# Patient Record
Sex: MALE | Race: OTHER | Employment: FULL TIME | URBAN - METROPOLITAN AREA
[De-identification: names, ages, dates, MRNs, and addresses within clinical notes are randomized per-mention and may not be internally consistent; named-entity substitution may affect disease eponyms.]

---

## 2017-04-24 ENCOUNTER — OFFICE VISIT (OUTPATIENT)
Dept: FAMILY MEDICINE CLINIC | Facility: CLINIC | Age: 32
End: 2017-04-24

## 2017-04-24 VITALS
RESPIRATION RATE: 20 BRPM | DIASTOLIC BLOOD PRESSURE: 98 MMHG | WEIGHT: 315 LBS | HEIGHT: 73.25 IN | BODY MASS INDEX: 41.3 KG/M2 | SYSTOLIC BLOOD PRESSURE: 142 MMHG | HEART RATE: 96 BPM

## 2017-04-24 DIAGNOSIS — R06.81 WITNESSED APNEIC SPELLS: ICD-10-CM

## 2017-04-24 DIAGNOSIS — Z00.00 ROUTINE GENERAL MEDICAL EXAMINATION AT A HEALTH CARE FACILITY: Primary | ICD-10-CM

## 2017-04-24 PROCEDURE — 99385 PREV VISIT NEW AGE 18-39: CPT | Performed by: FAMILY MEDICINE

## 2017-04-24 NOTE — PATIENT INSTRUCTIONS
-eat smaller portion sizes, limit seconds, drink more water during meals, eat slower in order to feel full sooner  -eat fruits or vegetables for snacks if hungry in between  -limit juice and soda  -exercise goal of 20-30min for 2-3x/wk to start  -schedul

## 2017-04-24 NOTE — PROGRESS NOTES
Jennifer Serna is a 28year old male who is here for Patient presents with:  Physical      HPI:     Here for physical, has been > 1 yr    Screening:  Diet: could be better  Exercise: could be better  Sleep: thinks he has apnea, interseted in sleep stud Alcohol Use: Yes           0.6 - 1.2 oz/week       1-2 Cans of beer per week          EXAM:   /98 mmHg  Pulse 96  Resp 20  Ht 73.25\"  Wt 388 lb  BMI 50.82 kg/m2    GENERAL: A&O well developed, well nourished,in no apparent distress  HEENT: atraumati

## 2017-04-27 ENCOUNTER — LAB ENCOUNTER (OUTPATIENT)
Dept: LAB | Age: 32
End: 2017-04-27
Attending: FAMILY MEDICINE
Payer: COMMERCIAL

## 2017-04-27 DIAGNOSIS — Z00.00 ROUTINE GENERAL MEDICAL EXAMINATION AT A HEALTH CARE FACILITY: ICD-10-CM

## 2017-04-27 PROCEDURE — 80061 LIPID PANEL: CPT | Performed by: FAMILY MEDICINE

## 2017-04-27 PROCEDURE — 36415 COLL VENOUS BLD VENIPUNCTURE: CPT | Performed by: FAMILY MEDICINE

## 2017-04-27 PROCEDURE — 82306 VITAMIN D 25 HYDROXY: CPT | Performed by: FAMILY MEDICINE

## 2017-04-27 PROCEDURE — 80050 GENERAL HEALTH PANEL: CPT | Performed by: FAMILY MEDICINE

## 2017-05-05 NOTE — PROGRESS NOTES
Quick Note:    Blood counts, electrolytes, sugar and thyroid all normal  -cholesterol mildly elevated, increase exercise, reduce fats and sugars, more fruits and veggies  -vit d low - -Vit D 50,000 units weekly x 8 wks, then recheck Vit D upon completion

## 2017-05-08 ENCOUNTER — TELEPHONE (OUTPATIENT)
Dept: FAMILY MEDICINE CLINIC | Facility: CLINIC | Age: 32
End: 2017-05-08

## 2017-05-08 RX ORDER — ERGOCALCIFEROL 1.25 MG/1
50000 CAPSULE ORAL WEEKLY
Qty: 8 CAPSULE | Refills: 0 | Status: SHIPPED | OUTPATIENT
Start: 2017-05-08 | End: 2017-07-07

## 2017-05-08 NOTE — TELEPHONE ENCOUNTER
----- Message from David Parkinson MD sent at 5/5/2017 12:38 PM CDT -----  Blood counts, electrolytes, sugar and thyroid all normal  -cholesterol mildly elevated, increase exercise, reduce fats and sugars, more fruits and veggies  -vit d low - -Vit D 50,00

## 2017-05-08 NOTE — TELEPHONE ENCOUNTER
Spoke to patient and answered his questions. Pt verbalized understanding. He states he is scheduled for a sleep study mid month as well.

## 2017-05-08 NOTE — TELEPHONE ENCOUNTER
Newton Garcia has a few questions for Edison Hinds that was not mentioned on the message, that he was concerned about, please call him at [phone]

## 2017-05-17 ENCOUNTER — OFFICE VISIT (OUTPATIENT)
Dept: SLEEP CENTER | Facility: HOSPITAL | Age: 32
End: 2017-05-17
Attending: FAMILY MEDICINE
Payer: COMMERCIAL

## 2017-05-17 PROCEDURE — 95810 POLYSOM 6/> YRS 4/> PARAM: CPT

## 2017-05-22 NOTE — PROCEDURES
[address] [address],[facility] [address]       Accredited by the [facility] ([facility])    PATIENT'S NAME:        Terrence Hernandez  ATTENDING PHYSICIAN:   Scooter Taylor M.D. REFERRING PHYSICIAN:   MD JESSA Fraire apnea and hypopnea index of 22 events per hour. The patient was also manifested obstructive event with an obstructive apnea-hypopnea index of 5 events per hour. Throughout the study, the patient maintained an oxyhemoglobin saturation above 90%.   Cheyne-S

## 2017-05-24 RX ORDER — LISINOPRIL 10 MG/1
TABLET ORAL
Qty: 30 TABLET | Refills: 2 | Status: SHIPPED | OUTPATIENT
Start: 2017-05-24 | End: 2017-09-18

## 2017-05-24 NOTE — TELEPHONE ENCOUNTER
Faxed refill request received from [facility] for lisinopril 10 mg  This was prescribed by previous provider  OK to refill?

## 2017-05-31 ENCOUNTER — TELEPHONE (OUTPATIENT)
Dept: FAMILY MEDICINE CLINIC | Facility: CLINIC | Age: 32
End: 2017-05-31

## 2017-05-31 DIAGNOSIS — G47.33 OSA (OBSTRUCTIVE SLEEP APNEA): Primary | ICD-10-CM

## 2017-05-31 NOTE — TELEPHONE ENCOUNTER
----- Message from Mina Arshad MD sent at 5/31/2017  2:22 PM CDT -----  Abnormal sleep study  -please refer to pulmonary for followup HONORIO Matute) - dx: FLASH  -thanks

## 2017-05-31 NOTE — PROGRESS NOTES
Quick Note:    Abnormal sleep study  -please refer to pulmonary for followup HONORIO Cortes) - dx: FLASH  -thanks  ______

## 2017-05-31 NOTE — TELEPHONE ENCOUNTER
Lm on private voicemail, per signed consent, with sleep study results and recommendation to follow up with a pulmonologist.  Pt provided with Dr. Lucero Busch office contact information and advised to call the office if he has any questions or concerns.

## 2017-06-28 ENCOUNTER — OFFICE VISIT (OUTPATIENT)
Dept: SLEEP CENTER | Facility: HOSPITAL | Age: 32
End: 2017-06-28
Attending: INTERNAL MEDICINE
Payer: COMMERCIAL

## 2017-06-28 PROCEDURE — 95811 POLYSOM 6/>YRS CPAP 4/> PARM: CPT

## 2017-07-05 NOTE — PROCEDURES
[address]       Accredited by the [facility] ([facility])    PATIENT'S NAME:        Julia Sebastian  ATTENDING PHYSICIAN:   Radha Rodríguez M.D.   REFERRING PHYSICIAN:   Radha Rodríguez M.D. Obstructive apneas and hypopneas as well as central apneas were resolved with CPAP at 11 cm of water.   At this pressure, during supine REM sleep the patient did not manifest any significant respiratory events and maintained an oxyhemoglobin saturation abov

## 2017-07-31 ENCOUNTER — TELEPHONE (OUTPATIENT)
Dept: FAMILY MEDICINE CLINIC | Facility: CLINIC | Age: 32
End: 2017-07-31

## 2017-07-31 DIAGNOSIS — E55.9 VITAMIN D DEFICIENCY: Primary | ICD-10-CM

## 2017-07-31 NOTE — TELEPHONE ENCOUNTER
Silvia Cherry had tonsilitis about 2 weeks ago and his tonsils are still hurting him, he does not see any redness or swelling, he is just wondering if this is normal or if something else can be going on. He would like a call from the office.  [phone]

## 2017-07-31 NOTE — TELEPHONE ENCOUNTER
Future Appointments  Date Time Provider [place]   8/4/2017 7:00 AM Rafael Shipley MD [facility] [place]   9/14/2017 3:40 PM RAJESH [place] [address] [facility] [address]     Patient also needs order for Vit D lab    Patient advised to take ibupr

## 2017-09-18 RX ORDER — LISINOPRIL 10 MG/1
TABLET ORAL
Qty: 30 TABLET | Refills: 2 | Status: SHIPPED | OUTPATIENT
Start: 2017-09-18 | End: 2018-01-15

## 2018-01-15 RX ORDER — LISINOPRIL 10 MG/1
TABLET ORAL
Qty: 30 TABLET | Refills: 0 | Status: SHIPPED | OUTPATIENT
Start: 2018-01-15 | End: 2018-03-05

## 2018-01-15 NOTE — TELEPHONE ENCOUNTER
Lisinopril approved qty 30 NR  Patient past due for ov  Please call to schedule appt-will need for further refills  [phone] (home)

## 2018-03-05 ENCOUNTER — OFFICE VISIT (OUTPATIENT)
Dept: FAMILY MEDICINE CLINIC | Facility: CLINIC | Age: 33
End: 2018-03-05

## 2018-03-05 VITALS
WEIGHT: 315 LBS | HEART RATE: 108 BPM | HEIGHT: 73.5 IN | DIASTOLIC BLOOD PRESSURE: 76 MMHG | BODY MASS INDEX: 40.86 KG/M2 | SYSTOLIC BLOOD PRESSURE: 138 MMHG

## 2018-03-05 DIAGNOSIS — G47.33 OSA (OBSTRUCTIVE SLEEP APNEA): ICD-10-CM

## 2018-03-05 DIAGNOSIS — E66.01 MORBID OBESITY DUE TO EXCESS CALORIES (HCC): ICD-10-CM

## 2018-03-05 DIAGNOSIS — K76.0 STEATOSIS OF LIVER: ICD-10-CM

## 2018-03-05 DIAGNOSIS — E78.5 DYSLIPIDEMIA: ICD-10-CM

## 2018-03-05 DIAGNOSIS — I10 ESSENTIAL HYPERTENSION WITH GOAL BLOOD PRESSURE LESS THAN 140/90: Primary | ICD-10-CM

## 2018-03-05 PROCEDURE — 99215 OFFICE O/P EST HI 40 MIN: CPT | Performed by: FAMILY MEDICINE

## 2018-03-05 RX ORDER — LISINOPRIL 10 MG/1
TABLET ORAL
Qty: 90 TABLET | Refills: 0 | Status: SHIPPED | OUTPATIENT
Start: 2018-03-05 | End: 2018-07-13

## 2018-03-05 NOTE — PATIENT INSTRUCTIONS
-- cut out soda and sweets  -- limit carbs  -- increase walking  -- come in for labs any day after 4/27  -- followup with me after that in early May for weight check

## 2018-03-07 NOTE — PROGRESS NOTES
Jossue Bearden is a 35year old male here for Patient presents with:  Hypertension  Knee Pain: bilateral knee pain       HPI:     1. Essential hypertension with goal blood pressure less than 140/90  2. Steatosis of liver  3.  Morbid obesity due to excess BY MOUTH ONCE DAILY Disp: 90 tablet Rfl: 0   Calcipotriene-Betameth Diprop (TACLONEX) 0.005-0.064 % External Ointment Apply topically and gently message into affected area once daily.  Disp: 100 g Rfl: 2       Allergies:  No Known Allergies      ROS:     --

## 2018-07-13 RX ORDER — LISINOPRIL 10 MG/1
TABLET ORAL
Qty: 30 TABLET | Refills: 0 | Status: SHIPPED | OUTPATIENT
Start: 2018-07-13 | End: 2018-07-20

## 2018-07-13 NOTE — TELEPHONE ENCOUNTER
Dr Kidd,patient's last labs were in 04/2017. Please advise lab orders for patient.  He has failed the refill protocol

## 2018-07-20 ENCOUNTER — LAB ENCOUNTER (OUTPATIENT)
Dept: LAB | Age: 33
End: 2018-07-20
Attending: FAMILY MEDICINE
Payer: COMMERCIAL

## 2018-07-20 ENCOUNTER — OFFICE VISIT (OUTPATIENT)
Dept: FAMILY MEDICINE CLINIC | Facility: CLINIC | Age: 33
End: 2018-07-20
Payer: COMMERCIAL

## 2018-07-20 VITALS
SYSTOLIC BLOOD PRESSURE: 140 MMHG | HEART RATE: 88 BPM | BODY MASS INDEX: 41.3 KG/M2 | HEIGHT: 73.23 IN | WEIGHT: 315 LBS | DIASTOLIC BLOOD PRESSURE: 88 MMHG

## 2018-07-20 DIAGNOSIS — Z00.00 ROUTINE GENERAL MEDICAL EXAMINATION AT A HEALTH CARE FACILITY: ICD-10-CM

## 2018-07-20 DIAGNOSIS — E78.5 DYSLIPIDEMIA: ICD-10-CM

## 2018-07-20 DIAGNOSIS — M54.50 CHRONIC BILATERAL LOW BACK PAIN WITHOUT SCIATICA: ICD-10-CM

## 2018-07-20 DIAGNOSIS — Z00.00 ROUTINE GENERAL MEDICAL EXAMINATION AT A HEALTH CARE FACILITY: Primary | ICD-10-CM

## 2018-07-20 DIAGNOSIS — I10 ESSENTIAL HYPERTENSION: ICD-10-CM

## 2018-07-20 DIAGNOSIS — E55.9 VITAMIN D DEFICIENCY: ICD-10-CM

## 2018-07-20 DIAGNOSIS — G89.29 CHRONIC BILATERAL LOW BACK PAIN WITHOUT SCIATICA: ICD-10-CM

## 2018-07-20 LAB
ALBUMIN SERPL-MCNC: 3.6 G/DL (ref 3.5–4.8)
ALBUMIN/GLOB SERPL: 0.7 {RATIO} (ref 1–2)
ALP LIVER SERPL-CCNC: 98 U/L (ref 45–117)
ALT SERPL-CCNC: 128 U/L (ref 17–63)
ANION GAP SERPL CALC-SCNC: 6 MMOL/L (ref 0–18)
AST SERPL-CCNC: 89 U/L (ref 15–41)
BASOPHILS # BLD AUTO: 0.07 X10(3) UL (ref 0–0.1)
BASOPHILS NFR BLD AUTO: 0.8 %
BILIRUB SERPL-MCNC: 0.6 MG/DL (ref 0.1–2)
BUN BLD-MCNC: 9 MG/DL (ref 8–20)
BUN/CREAT SERPL: 11.7 (ref 10–20)
CALCIUM BLD-MCNC: 9.1 MG/DL (ref 8.3–10.3)
CHLORIDE SERPL-SCNC: 105 MMOL/L (ref 101–111)
CHOLEST SMN-MCNC: 193 MG/DL (ref ?–200)
CO2 SERPL-SCNC: 28 MMOL/L (ref 22–32)
CREAT BLD-MCNC: 0.77 MG/DL (ref 0.7–1.3)
EOSINOPHIL # BLD AUTO: 0.24 X10(3) UL (ref 0–0.3)
EOSINOPHIL NFR BLD AUTO: 2.8 %
ERYTHROCYTE [DISTWIDTH] IN BLOOD BY AUTOMATED COUNT: 13.2 % (ref 11.5–16)
GLOBULIN PLAS-MCNC: 5.2 G/DL (ref 2.5–3.7)
GLUCOSE BLD-MCNC: 77 MG/DL (ref 70–99)
HCT VFR BLD AUTO: 47.8 % (ref 37–53)
HDLC SERPL-MCNC: 38 MG/DL (ref 45–?)
HDLC SERPL: 5.08 {RATIO} (ref ?–4.97)
HGB BLD-MCNC: 15.6 G/DL (ref 13–17)
IMMATURE GRANULOCYTE COUNT: 0.03 X10(3) UL (ref 0–1)
IMMATURE GRANULOCYTE RATIO %: 0.4 %
LDLC SERPL CALC-MCNC: 138 MG/DL (ref ?–130)
LYMPHOCYTES # BLD AUTO: 2.04 X10(3) UL (ref 0.9–4)
LYMPHOCYTES NFR BLD AUTO: 24.1 %
M PROTEIN MFR SERPL ELPH: 8.8 G/DL (ref 6.1–8.3)
MCH RBC QN AUTO: 29.7 PG (ref 27–33.2)
MCHC RBC AUTO-ENTMCNC: 32.6 G/DL (ref 31–37)
MCV RBC AUTO: 91 FL (ref 80–99)
MONOCYTES # BLD AUTO: 0.56 X10(3) UL (ref 0.1–1)
MONOCYTES NFR BLD AUTO: 6.6 %
NEUTROPHIL ABS PRELIM: 5.53 X10 (3) UL (ref 1.3–6.7)
NEUTROPHILS # BLD AUTO: 5.53 X10(3) UL (ref 1.3–6.7)
NEUTROPHILS NFR BLD AUTO: 65.3 %
NONHDLC SERPL-MCNC: 155 MG/DL (ref ?–130)
OSMOLALITY SERPL CALC.SUM OF ELEC: 285 MOSM/KG (ref 275–295)
PLATELET # BLD AUTO: 248 10(3)UL (ref 150–450)
POTASSIUM SERPL-SCNC: 4.3 MMOL/L (ref 3.6–5.1)
RBC # BLD AUTO: 5.25 X10(6)UL (ref 4.3–5.7)
RED CELL DISTRIBUTION WIDTH-SD: 44.9 FL (ref 35.1–46.3)
SODIUM SERPL-SCNC: 139 MMOL/L (ref 136–144)
TRIGL SERPL-MCNC: 86 MG/DL (ref ?–150)
TSI SER-ACNC: 2.64 MIU/ML (ref 0.35–5.5)
VIT D+METAB SERPL-MCNC: 10.1 NG/ML (ref 30–100)
VLDLC SERPL CALC-MCNC: 17 MG/DL (ref 5–40)
WBC # BLD AUTO: 8.5 X10(3) UL (ref 4–13)

## 2018-07-20 PROCEDURE — 99395 PREV VISIT EST AGE 18-39: CPT | Performed by: FAMILY MEDICINE

## 2018-07-20 PROCEDURE — 82306 VITAMIN D 25 HYDROXY: CPT | Performed by: FAMILY MEDICINE

## 2018-07-20 PROCEDURE — 80050 GENERAL HEALTH PANEL: CPT | Performed by: FAMILY MEDICINE

## 2018-07-20 PROCEDURE — 36415 COLL VENOUS BLD VENIPUNCTURE: CPT | Performed by: FAMILY MEDICINE

## 2018-07-20 PROCEDURE — 80061 LIPID PANEL: CPT | Performed by: FAMILY MEDICINE

## 2018-07-20 PROCEDURE — 99214 OFFICE O/P EST MOD 30 MIN: CPT | Performed by: FAMILY MEDICINE

## 2018-07-20 RX ORDER — LISINOPRIL 10 MG/1
10 TABLET ORAL
Qty: 90 TABLET | Refills: 1 | Status: SHIPPED | OUTPATIENT
Start: 2018-07-20 | End: 2019-02-21

## 2018-07-20 RX ORDER — METFORMIN HYDROCHLORIDE 500 MG/1
1000 TABLET, EXTENDED RELEASE ORAL
Qty: 60 TABLET | Refills: 2 | Status: SHIPPED | OUTPATIENT
Start: 2018-07-20 | End: 2018-12-10

## 2018-07-20 NOTE — PATIENT INSTRUCTIONS
-- continue with diet and exercise changes  -- start metformin 1 tab daily with food for 1-2 wks, then increase to 2tabs each morning  -- continue lisinopril    -- call to schedule PT around wed next week    -- followup in 1 month, sooner if needed

## 2018-07-20 NOTE — PROGRESS NOTES
Chantal Redmond is a 35year old male who is here for Patient presents with:  Hypertension: follow-up  Cholesterol: Dislipidemia      HPI:     1. Routine general medical examination at a health care facility  -interested in wellness    2.  Dyslipidemia rashes  PSYCH: mood is stable  EXT: denies edema     Wt Readings from Last 6 Encounters:  07/20/18 : (!) 412 lb  03/05/18 : (!) 406 lb  09/21/17 : (!) 390 lb  06/12/17 : (!) 384 lb  04/24/17 : (!) 388 lb  04/28/16 : (!) 382 lb      No Known Allergies    Pa II-XII grossly intact  PSYCH: pleasant  EXTREMITIES: no cyanosis, clubbing or edema      Problem focused exam (for problems outside of physical, if any):    MUSCULOSKELETAL: normal gait, no appreciable defects; tenderness in b/l lumbar paraspinals  SKIN: p these issues and agrees to the plan. The patient is asked to return in 1 month, sooner prn.   Tamela Mazariegos MD

## 2018-07-30 ENCOUNTER — TELEPHONE (OUTPATIENT)
Dept: FAMILY MEDICINE CLINIC | Facility: CLINIC | Age: 33
End: 2018-07-30

## 2018-07-30 DIAGNOSIS — E55.9 VITAMIN D DEFICIENCY: ICD-10-CM

## 2018-07-30 DIAGNOSIS — R79.89 ELEVATED LFTS: Primary | ICD-10-CM

## 2018-07-30 DIAGNOSIS — E78.00 ELEVATED LDL CHOLESTEROL LEVEL: ICD-10-CM

## 2018-07-30 RX ORDER — ERGOCALCIFEROL 1.25 MG/1
50000 CAPSULE ORAL WEEKLY
Qty: 12 CAPSULE | Refills: 0 | Status: SHIPPED | OUTPATIENT
Start: 2018-07-30 | End: 2018-10-28

## 2018-07-30 NOTE — TELEPHONE ENCOUNTER
The patient was informed of his test results and Dr. Yonatan Vernon recommendations. Vitamin d prescription was sent to the patient's preferred pharmacy and future lab orders were placed.  The patient verbalized understanding and has no further questions at this t

## 2018-07-30 NOTE — TELEPHONE ENCOUNTER
----- Message from Geetha Barnett MD sent at 7/29/2018 11:05 PM CDT -----  Vit D low - Vit D 50,000 units weekly x 12 wks, then recheck Vit D upon completion    cholesterol and liver function tests elevated - increase exercise, reduce fats and sugars, mor

## 2018-07-30 NOTE — PROGRESS NOTES
Vit D low - Vit D 50,000 units weekly x 12 wks, then recheck Vit D upon completion    cholesterol and liver function tests elevated - increase exercise, reduce fats and sugars, more fruits and veggies, try to lose weight    Recheck vit D, lipid and cmp in

## 2018-12-10 RX ORDER — METFORMIN HYDROCHLORIDE 500 MG/1
TABLET, EXTENDED RELEASE ORAL
Qty: 60 TABLET | Refills: 1 | Status: SHIPPED | OUTPATIENT
Start: 2018-12-10 | End: 2019-02-21

## 2018-12-18 ENCOUNTER — TELEPHONE (OUTPATIENT)
Dept: FAMILY MEDICINE CLINIC | Facility: CLINIC | Age: 33
End: 2018-12-18

## 2018-12-18 ENCOUNTER — OFFICE VISIT (OUTPATIENT)
Dept: FAMILY MEDICINE CLINIC | Facility: CLINIC | Age: 33
End: 2018-12-18
Payer: COMMERCIAL

## 2018-12-18 ENCOUNTER — APPOINTMENT (OUTPATIENT)
Dept: LAB | Age: 33
End: 2018-12-18
Attending: FAMILY MEDICINE
Payer: COMMERCIAL

## 2018-12-18 ENCOUNTER — OFFICE VISIT (OUTPATIENT)
Dept: PHYSICAL THERAPY | Age: 33
End: 2018-12-18
Attending: FAMILY MEDICINE
Payer: COMMERCIAL

## 2018-12-18 VITALS
HEART RATE: 92 BPM | OXYGEN SATURATION: 98 % | DIASTOLIC BLOOD PRESSURE: 82 MMHG | SYSTOLIC BLOOD PRESSURE: 138 MMHG | HEIGHT: 73.82 IN | WEIGHT: 315 LBS | BODY MASS INDEX: 40.43 KG/M2 | TEMPERATURE: 99 F

## 2018-12-18 DIAGNOSIS — R79.89 ELEVATED LFTS: ICD-10-CM

## 2018-12-18 DIAGNOSIS — M54.50 CHRONIC BILATERAL LOW BACK PAIN WITHOUT SCIATICA: ICD-10-CM

## 2018-12-18 DIAGNOSIS — L40.9 PSORIASIS: ICD-10-CM

## 2018-12-18 DIAGNOSIS — R10.11 RUQ PAIN: ICD-10-CM

## 2018-12-18 DIAGNOSIS — G89.29 CHRONIC BILATERAL LOW BACK PAIN WITHOUT SCIATICA: ICD-10-CM

## 2018-12-18 DIAGNOSIS — E55.9 VITAMIN D DEFICIENCY: ICD-10-CM

## 2018-12-18 DIAGNOSIS — I10 ESSENTIAL HYPERTENSION: Primary | ICD-10-CM

## 2018-12-18 DIAGNOSIS — E78.00 ELEVATED LDL CHOLESTEROL LEVEL: ICD-10-CM

## 2018-12-18 DIAGNOSIS — E66.01 CLASS 3 SEVERE OBESITY DUE TO EXCESS CALORIES WITHOUT SERIOUS COMORBIDITY WITH BODY MASS INDEX (BMI) OF 50.0 TO 59.9 IN ADULT (HCC): ICD-10-CM

## 2018-12-18 PROCEDURE — 97110 THERAPEUTIC EXERCISES: CPT

## 2018-12-18 PROCEDURE — 97161 PT EVAL LOW COMPLEX 20 MIN: CPT

## 2018-12-18 PROCEDURE — 36415 COLL VENOUS BLD VENIPUNCTURE: CPT | Performed by: FAMILY MEDICINE

## 2018-12-18 PROCEDURE — 99214 OFFICE O/P EST MOD 30 MIN: CPT | Performed by: FAMILY MEDICINE

## 2018-12-18 PROCEDURE — 80061 LIPID PANEL: CPT | Performed by: FAMILY MEDICINE

## 2018-12-18 PROCEDURE — 82306 VITAMIN D 25 HYDROXY: CPT | Performed by: FAMILY MEDICINE

## 2018-12-18 PROCEDURE — 80053 COMPREHEN METABOLIC PANEL: CPT | Performed by: FAMILY MEDICINE

## 2018-12-18 RX ORDER — CALCIPOTRIENE, BETAMETHASONE DIPROPIONATE 50; .643 UG/G; MG/G
OINTMENT TOPICAL
Qty: 100 G | Refills: 2 | Status: SHIPPED | OUTPATIENT
Start: 2018-12-18 | End: 2018-12-18

## 2018-12-18 NOTE — PATIENT INSTRUCTIONS
-- call dermatology for appt  -- call weight loss clinic for appt  -- call to schedule US of abdomen for gallstones  -- increase metformin to 2x/day  -- followup in 3 months, sooner if needed

## 2018-12-18 NOTE — TELEPHONE ENCOUNTER
Called pt and lmom for pt to call his previous derm for his med info and then asked him to call our office with this information

## 2018-12-18 NOTE — TELEPHONE ENCOUNTER
Doc,    The script for the calcipot/betame oint is too expensive. It will cost patient over $1000 because not covered by insurance. Please advise.   thanks

## 2018-12-18 NOTE — PROGRESS NOTES
SPINE EVALUATION:   Referring Physician: Dr. Barba Case  Diagnosis: Chronic bilateral low back pain without sciatica      Date of Service: 12/18/2018     PATIENT SUMMARY   Sarah Nguyen is a 35year old male who presents to therapy today with complaints o household and community level activity with gradually increasing pain over the past 10 years. Pt goals include to find a way to get a good stretch on his own, find out what is going on, and to go home feeling better.    Past medical history was reviewed wit light touch intact throughout, patient reports intermittent feeling of tingling in the back and LEs without numbness    Lumbar AROM:   Flexion: fingertips to mid-lower leg, 86 deg, reporting feeling of pulling KIM posterior LEs  Extension: 16 deg with midl response to treatment, patient is encouraged to be in PT and hopeful that it will be helpful since has been experiencing this pain for so many years.  Following assessment, use of repeated motions and slump sliders, pain decreased from 5/10 baseline to 3/10 return this letter via fax as soon as possible to [phone].  If you have any questions, please contact me at Dept: [phone]    Sincerely,  Electronically signed by therapist: Sejal Lebron PT, DPT    Physician's certification required: Yes  I certif

## 2018-12-18 NOTE — PROGRESS NOTES
Prabhjot Frey is a 35year old male here for Patient presents with:  Weight Loss: follow Weight loss and check Hgb1ac  Liver Enzymes: Follow up   Psoriasis: Referral to Dermatologist      HPI:       1.  Essential hypertension  -trying to eat better  -driver (TACLONEX) 0.005-0.064 % External Ointment Apply topically and gently message into affected area once daily.  Disp: 100 g Rfl: 2   METFORMIN HCL  MG Oral Tablet 24 Hr TAKE 2 TABLETS BY MOUTH IN THE MORNING WITH BREAKFAST (Patient taking differently: T Sig: Apply topically and gently message into affected area once daily.        Imaging & Referrals:  [facility] - INTERNAL  OP REFERRAL TO WEIGHT LOSS CLINIC  US ABDOMEN LIMITED (AUU=[id number])     Rolf Post MD

## 2018-12-19 RX ORDER — CLOBETASOL PROPIONATE 0.5 MG/G
1 CREAM TOPICAL 2 TIMES DAILY
Qty: 100 G | Refills: 1 | Status: SHIPPED | OUTPATIENT
Start: 2018-12-19 | End: 2019-04-22

## 2018-12-19 NOTE — TELEPHONE ENCOUNTER
Spoke to patient and he spoke to his pharmacist and was told that he could use the generic 0.05% oint clobetasol instead.     Is this something you want to change him to?    thanks

## 2018-12-20 ENCOUNTER — OFFICE VISIT (OUTPATIENT)
Dept: PHYSICAL THERAPY | Age: 33
End: 2018-12-20
Attending: FAMILY MEDICINE
Payer: COMMERCIAL

## 2018-12-20 PROCEDURE — 97110 THERAPEUTIC EXERCISES: CPT

## 2018-12-20 PROCEDURE — 97140 MANUAL THERAPY 1/> REGIONS: CPT

## 2018-12-20 NOTE — PROGRESS NOTES
Dx: Chronic bilateral low back pain without sciatica           Authorized # of Visits:  10 requested (PPO)       Next MD visit: none scheduled  Fall Risk: standard         Precautions: n/a             Subjective: Patient reports that he has been feeling an SB seated EOM QL x 5 R/L         Standing slider/tensioner with foot on step, IR/ER x 15R/L         - standing ext at table x 6  - with L foot on step 2 x 5         Supine piriformis stretch with strap 2 x 30s R/L         - PPU with L flexion x 8  -

## 2018-12-27 ENCOUNTER — APPOINTMENT (OUTPATIENT)
Dept: PHYSICAL THERAPY | Age: 33
End: 2018-12-27
Attending: FAMILY MEDICINE
Payer: COMMERCIAL

## 2018-12-27 ENCOUNTER — TELEPHONE (OUTPATIENT)
Dept: FAMILY MEDICINE CLINIC | Facility: CLINIC | Age: 33
End: 2018-12-27

## 2018-12-27 RX ORDER — ERGOCALCIFEROL 1.25 MG/1
50000 CAPSULE ORAL WEEKLY
Qty: 12 CAPSULE | Refills: 0 | Status: SHIPPED | OUTPATIENT
Start: 2018-12-27 | End: 2019-03-27

## 2018-12-27 NOTE — TELEPHONE ENCOUNTER
----- Message from Nhi Bazzi MD sent at 12/27/2018  3:10 PM CST -----  Cholesterol and liver function still elevated, but improving. Continue with diet and exercise changes as discussed.       Vit D low - Vit D 50,000 units weekly x 12 wks, then star

## 2019-01-04 ENCOUNTER — TELEPHONE (OUTPATIENT)
Dept: FAMILY MEDICINE CLINIC | Facility: CLINIC | Age: 34
End: 2019-01-04

## 2019-01-04 NOTE — TELEPHONE ENCOUNTER
Patient advised that the clobetasol requires a PA and that it won't be completed today. It will be completed next week and we will notify patient and pharmacy once it has been approved/denied.    Patient verbalized understanding

## 2019-01-04 NOTE — TELEPHONE ENCOUNTER
Pt said he has been waiting 2 days for an approval on Clobetasol Propionate ointment. He said his pharmacy faxed it over on Wednesday and he would like it approved.

## 2019-01-08 ENCOUNTER — APPOINTMENT (OUTPATIENT)
Dept: PHYSICAL THERAPY | Age: 34
End: 2019-01-08
Attending: FAMILY MEDICINE
Payer: COMMERCIAL

## 2019-01-10 ENCOUNTER — OFFICE VISIT (OUTPATIENT)
Dept: PHYSICAL THERAPY | Age: 34
End: 2019-01-10
Attending: FAMILY MEDICINE
Payer: COMMERCIAL

## 2019-01-10 PROCEDURE — 97140 MANUAL THERAPY 1/> REGIONS: CPT

## 2019-01-10 PROCEDURE — 97110 THERAPEUTIC EXERCISES: CPT

## 2019-01-10 NOTE — PROGRESS NOTES
Dx: Chronic bilateral low back pain without sciatica           Authorized # of Visits:  10 requested (PPO)       Next MD visit: none scheduled  Fall Risk: standard         Precautions: n/a             Subjective: Patient has not been seen in PT for several issued and progressed     Plan: Continue PT with progression as indicated.    Date: 12/20/2018 TX#: 2/10 Date: 1/10/19   TX#: 3/10   Date:               TX#: 4/ Date:               TX#: 5/ Date:               TX#: 6/ Date:               TX#: 7/ Date:

## 2019-01-15 ENCOUNTER — OFFICE VISIT (OUTPATIENT)
Dept: PHYSICAL THERAPY | Age: 34
End: 2019-01-15
Attending: FAMILY MEDICINE
Payer: COMMERCIAL

## 2019-01-15 PROCEDURE — 97110 THERAPEUTIC EXERCISES: CPT

## 2019-01-15 PROCEDURE — 97140 MANUAL THERAPY 1/> REGIONS: CPT

## 2019-01-15 NOTE — PROGRESS NOTES
Dx: Chronic bilateral low back pain without sciatica           Authorized # of Visits:  10 requested (PPO)       Next MD visit: none scheduled  Fall Risk: standard         Precautions: n/a           10 min late to session.    Subjective: Patient reports kalie progress made in PT. - issued and progressed     Plan: Continue PT with progression as indicated, progression endurance training.    Date: 12/20/2018 TX#: 2/10 Date: 1/10/19   TX#: 3/10   Date: 1/15/19    TX#: 4/10 Date:               TX#: 5/ Date: progression within tolerance, including progressed abdominal recruitment, initiated lumbar paraspinal endurance training.    HEP: LTR (hold at this time), piriformis stretch with strap, PPU with LLE bent up, and seated slump slider    Charges: Manual 1, The

## 2019-01-17 ENCOUNTER — OFFICE VISIT (OUTPATIENT)
Dept: PHYSICAL THERAPY | Age: 34
End: 2019-01-17
Attending: FAMILY MEDICINE
Payer: COMMERCIAL

## 2019-01-17 PROCEDURE — 97110 THERAPEUTIC EXERCISES: CPT

## 2019-01-17 PROCEDURE — 97140 MANUAL THERAPY 1/> REGIONS: CPT

## 2019-01-17 NOTE — PROGRESS NOTES
Dx: Chronic bilateral low back pain without sciatica           Authorized # of Visits:  10 requested (PPO)       Next MD visit: none scheduled  Fall Risk: standard         Precautions: n/a             Subjective: Patient reports feeling better today than o and compliant in HEP to maintain progress made in PT. - issued and progressed     Plan: Continue PT with progression as indicated, progression and focus on endurance training, lifting moderate-heavy loads.    Date: 12/20/2018 TX#: 2/10 Date: 1/10/19   TX#: with pelvic tilt 2 x 10  - SB bridge, arms at side x 10 (fatigued) - supine 90/90 march with pelvic tilt 2 x 10  - SB bridge, arms at side x 10, x 5 (fatigued)      Manual x 15 min  - lumbar STM  - lumbar C-PA mobs gr II/III L2-S1 Manual x 15 min  - lumbar

## 2019-01-22 ENCOUNTER — OFFICE VISIT (OUTPATIENT)
Dept: PHYSICAL THERAPY | Age: 34
End: 2019-01-22
Attending: FAMILY MEDICINE
Payer: COMMERCIAL

## 2019-01-22 PROCEDURE — 97140 MANUAL THERAPY 1/> REGIONS: CPT

## 2019-01-22 PROCEDURE — 97110 THERAPEUTIC EXERCISES: CPT

## 2019-01-22 NOTE — PROGRESS NOTES
Dx: Chronic bilateral low back pain without sciatica           Authorized # of Visits:  10 requested (PPO)       Next MD visit: none scheduled  Fall Risk: standard         Precautions: n/a             Subjective: Patient reports that he has been feeling pr be independent and compliant in HEP to maintain progress made in PT. - issued and progressed     Plan: Continue PT with progression as indicated, progression and focus on endurance training, lifting moderate-heavy loads.    Date: 12/20/2018 TX#: 2/10 Date: alt hip ext with focused glute set x 10 R/L Prone alt hip ext with focused glute set x 10 R/L Prone alt hip ext with focused glute set x 10 x 5 R/L Prone alt hip ext 2 x 10 (getting easier)     - supine pelvic tilt x 10  - supine pelvic tilt with march x 1

## 2019-01-24 ENCOUNTER — OFFICE VISIT (OUTPATIENT)
Dept: PHYSICAL THERAPY | Age: 34
End: 2019-01-24
Attending: FAMILY MEDICINE
Payer: COMMERCIAL

## 2019-01-24 PROCEDURE — 97140 MANUAL THERAPY 1/> REGIONS: CPT

## 2019-01-24 PROCEDURE — 97110 THERAPEUTIC EXERCISES: CPT

## 2019-01-24 NOTE — PROGRESS NOTES
Discharge Summary  Dx: Chronic bilateral low back pain without sciatica           Authorized # of Visits:  10 requested (PPO)       Next MD visit: none scheduled  Fall Risk: standard         Precautions: n/a        Pt has attended 7, cancelled 2, and no s WNL; L WNL  Rotation: R WNL; L WNL     Palpation: lumbar paraspinals WNL, NTTP to patient report     Strength:   LE   Hip flexion: R 5/5; L 5/5  Hip abduction: R 5/5; L 5/5  Hip Extension: R 5/5; L 5/5  Knee Flexion: R 5/5; L 5/5            Knee extension: 5B x 15R/L Shuttle  - DLS, 7B x 25  - SLS, 5B x 15 R/L Shuttle  - DLS, 8B x 30  - SLS, 6B x 15 R/L Shuttle  - DLS, 8B x 30  - SLS, 6B x 15 R/L    Standing slider/tensioner with foot on step, IR/ER x 15R/L Standing slider/tensioner with foot on step, IR/ER tilt 2 x 10  - SB bridge, arms at side x 10 (fatigued) - supine 90/90 march with pelvic tilt 2 x 10  - SB bridge, arms at side x 10, x 5 (fatigued) - supine 90/90 march with pelvic tilt 2 x 10  - SB bridge, arms at side 2 x 10  - SB 90/90 isometric with al

## 2019-01-29 ENCOUNTER — APPOINTMENT (OUTPATIENT)
Dept: PHYSICAL THERAPY | Age: 34
End: 2019-01-29
Payer: COMMERCIAL

## 2019-02-21 RX ORDER — METFORMIN HYDROCHLORIDE 500 MG/1
TABLET, EXTENDED RELEASE ORAL
Qty: 60 TABLET | Refills: 0 | Status: SHIPPED | OUTPATIENT
Start: 2019-02-21 | End: 2019-04-22

## 2019-02-21 RX ORDER — LISINOPRIL 10 MG/1
TABLET ORAL
Qty: 90 TABLET | Refills: 0 | Status: SHIPPED | OUTPATIENT
Start: 2019-02-21 | End: 2019-06-06

## 2019-04-22 ENCOUNTER — OFFICE VISIT (OUTPATIENT)
Dept: FAMILY MEDICINE CLINIC | Facility: CLINIC | Age: 34
End: 2019-04-22
Payer: COMMERCIAL

## 2019-04-22 VITALS
TEMPERATURE: 99 F | SYSTOLIC BLOOD PRESSURE: 134 MMHG | WEIGHT: 315 LBS | HEIGHT: 73.82 IN | HEART RATE: 102 BPM | BODY MASS INDEX: 40.43 KG/M2 | DIASTOLIC BLOOD PRESSURE: 78 MMHG

## 2019-04-22 DIAGNOSIS — L40.9 PSORIASIS: ICD-10-CM

## 2019-04-22 DIAGNOSIS — R79.89 ELEVATED LFTS: ICD-10-CM

## 2019-04-22 DIAGNOSIS — E66.01 CLASS 3 SEVERE OBESITY DUE TO EXCESS CALORIES WITHOUT SERIOUS COMORBIDITY WITH BODY MASS INDEX (BMI) OF 50.0 TO 59.9 IN ADULT (HCC): ICD-10-CM

## 2019-04-22 DIAGNOSIS — I10 ESSENTIAL HYPERTENSION: Primary | ICD-10-CM

## 2019-04-22 PROCEDURE — 99214 OFFICE O/P EST MOD 30 MIN: CPT | Performed by: FAMILY MEDICINE

## 2019-04-22 RX ORDER — METFORMIN HYDROCHLORIDE 500 MG/1
TABLET, EXTENDED RELEASE ORAL
Qty: 90 TABLET | Refills: 1 | Status: SHIPPED | OUTPATIENT
Start: 2019-04-22 | End: 2019-09-23

## 2019-04-22 RX ORDER — CLOBETASOL PROPIONATE 0.5 MG/G
1 OINTMENT TOPICAL 2 TIMES DAILY
Qty: 200 G | Refills: 1 | Status: SHIPPED | OUTPATIENT
Start: 2019-04-22 | End: 2020-03-10

## 2019-04-22 NOTE — PATIENT INSTRUCTIONS
-- continue to work on lifestyle changes  --try to increase exericse  -- continue with metformin once daily  -- continue with lisinopril    -- followup in 3 months to recheck weight and for your next physical (anytime after 7/20/19)  -- look into immigra

## 2019-04-24 NOTE — PROGRESS NOTES
Aileen Ramierz is a 29year old male here for Patient presents with:  Weight Loss: Follow up      HPI:       1. Essential hypertension  2. Class 3 severe obesity due to excess calories without serious comorbidity with body mass index (BMI) of 50.0 to 59. 1   metFORMIN HCl  MG Oral Tablet 24 Hr TAKE 1 TABLETS BY MOUTH ONCE DAILY IN THE MORNING WITH BREAKFAST Disp: 90 tablet Rfl: 1   LISINOPRIL 10 MG Oral Tab TAKE 1 TABLET BY MOUTH ONCE DAILY Disp: 90 tablet Rfl: 0       Allergies:  No Known Allergies

## 2019-06-06 RX ORDER — LISINOPRIL 10 MG/1
TABLET ORAL
Qty: 90 TABLET | Refills: 0 | Status: SHIPPED | OUTPATIENT
Start: 2019-06-06 | End: 2019-09-23

## 2019-09-23 NOTE — PROGRESS NOTES
Betina Garcia is a 29year old male here for Patient presents with: Anxiety      HPI:       1.  Anxiety  -has had for some time now - over a year  -notes difficulty sleeping (getting worse) - has symptoms of palpitations, frequent worry  -also notes inc Tab Take 1 tablet (10 mg total) by mouth once daily. Disp: 90 tablet Rfl: 1   buPROPion HCl ER, XL, 150 MG Oral Tablet 24 Hr Take 1 tablet (150 mg total) by mouth daily.  Disp: 90 tablet Rfl: 0   hydrOXYzine HCl 25 MG Oral Tab 1 - 2 tabs po prn severe anxie types were placed in this encounter. Meds This Visit:  Requested Prescriptions     Signed Prescriptions Disp Refills   • lisinopril 10 MG Oral Tab 90 tablet 1     Sig: Take 1 tablet (10 mg total) by mouth once daily.    • buPROPion HCl ER, XL, 150 MG O

## 2019-09-23 NOTE — PATIENT INSTRUCTIONS
Start wellbutrin each morning as directed    Hydroxyzine as needed for anxiety at night    If not working, can use clonazepam as needed for severe anxiety (may cause drowsiness)     will call with counseling options    Followup in 2 wks, kimberly

## 2019-12-16 ENCOUNTER — TELEPHONE (OUTPATIENT)
Dept: FAMILY MEDICINE CLINIC | Facility: CLINIC | Age: 34
End: 2019-12-16

## 2019-12-16 RX ORDER — BUPROPION HYDROCHLORIDE 150 MG/1
150 TABLET ORAL DAILY
Qty: 90 TABLET | Refills: 0 | Status: SHIPPED | OUTPATIENT
Start: 2019-12-16 | End: 2020-03-10

## 2019-12-16 NOTE — TELEPHONE ENCOUNTER
Last Refill 9/23/19.  90 tabs 0 refills. Last OV 9/23/19: notes: The patient is asked to return in 2 wks      Phoned patient and appointment made for 1/6/20. Refill sent to pharmacy.

## 2019-12-16 NOTE — TELEPHONE ENCOUNTER
Pt needs a refill on Wellbutrin 150mg 1 daily. He would like it sent to [facility] in [place].

## 2020-01-06 ENCOUNTER — OFFICE VISIT (OUTPATIENT)
Dept: FAMILY MEDICINE CLINIC | Facility: CLINIC | Age: 35
End: 2020-01-06
Payer: COMMERCIAL

## 2020-01-06 VITALS
DIASTOLIC BLOOD PRESSURE: 70 MMHG | SYSTOLIC BLOOD PRESSURE: 132 MMHG | TEMPERATURE: 99 F | WEIGHT: 315 LBS | HEIGHT: 73.82 IN | OXYGEN SATURATION: 98 % | HEART RATE: 87 BPM | BODY MASS INDEX: 40.43 KG/M2

## 2020-01-06 DIAGNOSIS — R79.89 ELEVATED LFTS: ICD-10-CM

## 2020-01-06 DIAGNOSIS — F41.9 ANXIETY: ICD-10-CM

## 2020-01-06 DIAGNOSIS — E66.01 CLASS 3 SEVERE OBESITY DUE TO EXCESS CALORIES WITHOUT SERIOUS COMORBIDITY WITH BODY MASS INDEX (BMI) OF 50.0 TO 59.9 IN ADULT (HCC): ICD-10-CM

## 2020-01-06 DIAGNOSIS — I10 ESSENTIAL HYPERTENSION: ICD-10-CM

## 2020-01-06 DIAGNOSIS — Z00.00 ROUTINE GENERAL MEDICAL EXAMINATION AT A HEALTH CARE FACILITY: Primary | ICD-10-CM

## 2020-01-06 DIAGNOSIS — E55.9 VITAMIN D DEFICIENCY: ICD-10-CM

## 2020-01-06 PROCEDURE — 99214 OFFICE O/P EST MOD 30 MIN: CPT | Performed by: FAMILY MEDICINE

## 2020-01-06 PROCEDURE — 99395 PREV VISIT EST AGE 18-39: CPT | Performed by: FAMILY MEDICINE

## 2020-01-06 NOTE — PROGRESS NOTES
Toño Burgess is a 29year old male who is here for Patient presents with: Anxiety: Follow up       HPI:       1. Routine general medical examination at a health care facility  -due    2. Essential hypertension  -doing well    3.  Elevated LFTs  -due any headaches, LH, dizzyness, LOC, falls  VISION: denies any blurred or double vision  RESPIRATORY: denies shortness of breath, cough, or congestion  CARDIOVASCULAR: denies chest pain, pressure or palpitations  GI: denies abdominal pain, constipation, diar Types: 1 - 2 Cans of beer per week      Frequency: 2-4 times a month      Drinks per session: 1 or 2      Binge frequency: Never    Drug use: No          EXAM:   /70 (BP Location: Left arm, Patient Position: Sitting, Cuff Size: adult)   Pulse 87   Te nightly      Orders This Visit:  No orders of the defined types were placed in this encounter.       Meds This Visit:  Requested Prescriptions      No prescriptions requested or ordered in this encounter       Imaging & Referrals:  None     The patient javier

## 2020-01-06 NOTE — PATIENT INSTRUCTIONS
-- come in for fasting bloodwork anytime that you are able to (8-10h no food, only water; lab here is open M-F, 8am-12)  -- go to [email or web address] to schedule an appointment online  -- we will call with results about 5-7 days after bloodwork is completed

## 2020-01-08 ENCOUNTER — LAB ENCOUNTER (OUTPATIENT)
Dept: LAB | Age: 35
End: 2020-01-08
Attending: FAMILY MEDICINE
Payer: COMMERCIAL

## 2020-01-08 DIAGNOSIS — Z00.00 ROUTINE GENERAL MEDICAL EXAMINATION AT A HEALTH CARE FACILITY: ICD-10-CM

## 2020-01-08 DIAGNOSIS — E55.9 VITAMIN D DEFICIENCY: ICD-10-CM

## 2020-01-08 LAB
ALBUMIN SERPL-MCNC: 3.6 G/DL (ref 3.4–5)
ALBUMIN/GLOB SERPL: 0.8 {RATIO} (ref 1–2)
ALP LIVER SERPL-CCNC: 97 U/L (ref 45–117)
ALT SERPL-CCNC: 34 U/L (ref 16–61)
ANION GAP SERPL CALC-SCNC: 6 MMOL/L (ref 0–18)
AST SERPL-CCNC: 23 U/L (ref 15–37)
BASOPHILS # BLD AUTO: 0.06 X10(3) UL (ref 0–0.2)
BASOPHILS NFR BLD AUTO: 0.9 %
BILIRUB SERPL-MCNC: 0.5 MG/DL (ref 0.1–2)
BUN BLD-MCNC: 8 MG/DL (ref 7–18)
BUN/CREAT SERPL: 9.5 (ref 10–20)
CALCIUM BLD-MCNC: 8.7 MG/DL (ref 8.5–10.1)
CHLORIDE SERPL-SCNC: 105 MMOL/L (ref 98–112)
CHOLEST SMN-MCNC: 156 MG/DL (ref ?–200)
CO2 SERPL-SCNC: 27 MMOL/L (ref 21–32)
CREAT BLD-MCNC: 0.84 MG/DL (ref 0.7–1.3)
DEPRECATED RDW RBC AUTO: 43.7 FL (ref 35.1–46.3)
EOSINOPHIL # BLD AUTO: 0.25 X10(3) UL (ref 0–0.7)
EOSINOPHIL NFR BLD AUTO: 3.6 %
ERYTHROCYTE [DISTWIDTH] IN BLOOD BY AUTOMATED COUNT: 13.3 % (ref 11–15)
GLOBULIN PLAS-MCNC: 4.7 G/DL (ref 2.8–4.4)
GLUCOSE BLD-MCNC: 87 MG/DL (ref 70–99)
HCT VFR BLD AUTO: 48 % (ref 39–53)
HDLC SERPL-MCNC: 36 MG/DL (ref 40–59)
HGB BLD-MCNC: 15.9 G/DL (ref 13–17.5)
IMM GRANULOCYTES # BLD AUTO: 0.03 X10(3) UL (ref 0–1)
IMM GRANULOCYTES NFR BLD: 0.4 %
LDLC SERPL CALC-MCNC: 104 MG/DL (ref ?–100)
LYMPHOCYTES # BLD AUTO: 1.56 X10(3) UL (ref 1–4)
LYMPHOCYTES NFR BLD AUTO: 22.4 %
M PROTEIN MFR SERPL ELPH: 8.3 G/DL (ref 6.4–8.2)
MCH RBC QN AUTO: 29.9 PG (ref 26–34)
MCHC RBC AUTO-ENTMCNC: 33.1 G/DL (ref 31–37)
MCV RBC AUTO: 90.2 FL (ref 80–100)
MONOCYTES # BLD AUTO: 0.7 X10(3) UL (ref 0.1–1)
MONOCYTES NFR BLD AUTO: 10.1 %
NEUTROPHILS # BLD AUTO: 4.35 X10 (3) UL (ref 1.5–7.7)
NEUTROPHILS # BLD AUTO: 4.35 X10(3) UL (ref 1.5–7.7)
NEUTROPHILS NFR BLD AUTO: 62.6 %
NONHDLC SERPL-MCNC: 120 MG/DL (ref ?–130)
OSMOLALITY SERPL CALC.SUM OF ELEC: 284 MOSM/KG (ref 275–295)
PATIENT FASTING Y/N/NP: YES
PATIENT FASTING Y/N/NP: YES
PLATELET # BLD AUTO: 265 10(3)UL (ref 150–450)
POTASSIUM SERPL-SCNC: 4.1 MMOL/L (ref 3.5–5.1)
RBC # BLD AUTO: 5.32 X10(6)UL (ref 4.3–5.7)
SODIUM SERPL-SCNC: 138 MMOL/L (ref 136–145)
TRIGL SERPL-MCNC: 79 MG/DL (ref 30–149)
TSI SER-ACNC: 3.05 MIU/ML (ref 0.36–3.74)
VIT D+METAB SERPL-MCNC: 19.4 NG/ML (ref 30–100)
VLDLC SERPL CALC-MCNC: 16 MG/DL (ref 0–30)
WBC # BLD AUTO: 7 X10(3) UL (ref 4–11)

## 2020-01-08 PROCEDURE — 36415 COLL VENOUS BLD VENIPUNCTURE: CPT | Performed by: FAMILY MEDICINE

## 2020-01-08 PROCEDURE — 80050 GENERAL HEALTH PANEL: CPT | Performed by: FAMILY MEDICINE

## 2020-01-08 PROCEDURE — 80061 LIPID PANEL: CPT | Performed by: FAMILY MEDICINE

## 2020-01-08 PROCEDURE — 82306 VITAMIN D 25 HYDROXY: CPT | Performed by: FAMILY MEDICINE

## 2020-01-09 ENCOUNTER — TELEPHONE (OUTPATIENT)
Dept: FAMILY MEDICINE CLINIC | Facility: CLINIC | Age: 35
End: 2020-01-09

## 2020-01-09 RX ORDER — ERGOCALCIFEROL 1.25 MG/1
50000 CAPSULE ORAL WEEKLY
Qty: 12 CAPSULE | Refills: 0 | Status: SHIPPED | OUTPATIENT
Start: 2020-01-09 | End: 2020-02-08

## 2020-01-09 NOTE — TELEPHONE ENCOUNTER
Labs resulted - see below    Vit D sent in    Thanks    ____      Labs look good. Liver tests have come back down to normal.  Cholesterol improving. Keep up the good work!     Vit D low - Vit D 50,000 units weekly x 12 wks, then start otc vit D [id number]

## 2020-03-09 NOTE — TELEPHONE ENCOUNTER
Bupropion Last filled 12/16/19  Clobetasol last filled 4/22/19    LOV 1/6/2020.   Followup in 3 months for recheck (April

## 2020-03-10 DIAGNOSIS — I10 ESSENTIAL HYPERTENSION: Primary | ICD-10-CM

## 2020-03-10 RX ORDER — CLOBETASOL PROPIONATE 0.5 MG/G
OINTMENT TOPICAL
Qty: 180 G | Refills: 0 | Status: SHIPPED | OUTPATIENT
Start: 2020-03-10

## 2020-03-10 RX ORDER — LISINOPRIL 10 MG/1
10 TABLET ORAL
Qty: 90 TABLET | Refills: 0 | Status: SHIPPED | OUTPATIENT
Start: 2020-03-10 | End: 2020-06-05

## 2020-03-10 RX ORDER — BUPROPION HYDROCHLORIDE 150 MG/1
TABLET ORAL
Qty: 90 TABLET | Refills: 0 | Status: SHIPPED | OUTPATIENT
Start: 2020-03-10 | End: 2020-06-05

## 2020-03-10 NOTE — TELEPHONE ENCOUNTER
Patient called requesting refill on lisinopril 10 MG Oral Tab  Be sent to his [facility] [address] [facility] on file.  Patient has appt on 4/20.e

## 2020-03-10 NOTE — TELEPHONE ENCOUNTER
Pt requesting refill of lisinopril 10 MG Oral Tab     Passed protocol, refill approved, sent to pharmacy

## 2020-06-04 DIAGNOSIS — I10 ESSENTIAL HYPERTENSION: ICD-10-CM

## 2020-06-04 RX ORDER — BUPROPION HYDROCHLORIDE 150 MG/1
TABLET ORAL
Qty: 90 TABLET | Refills: 0 | OUTPATIENT
Start: 2020-06-04

## 2020-06-04 RX ORDER — LISINOPRIL 10 MG/1
TABLET ORAL
Qty: 90 TABLET | Refills: 0 | OUTPATIENT
Start: 2020-06-04

## 2020-06-05 NOTE — TELEPHONE ENCOUNTER
Pt called stating he is still in [place] and is expected to come back in July but with everything he is still unsure and that is why he has not been able to make the appt.  The patient would like a refill on his BUPROPION HCL ER, XL, 150 MG Oral Tablet 24 Hr

## 2020-09-08 ENCOUNTER — TELEPHONE (OUTPATIENT)
Dept: FAMILY MEDICINE CLINIC | Facility: CLINIC | Age: 35
End: 2020-09-08

## 2020-09-08 NOTE — TELEPHONE ENCOUNTER
Ok to stop taking the medication - no need to wean off of it    He may feel mild side effects coming off of it, but there is no harm in stopping it    Now that he is with his family, he may not need the medication anyway.

## 2020-09-08 NOTE — TELEPHONE ENCOUNTER
Called patient back and relayed recommendations. He verbalized understanding and agreed with plan. States he will talk to spouse and decide what he wants to do. He will let us know if he stops it.      Patient advised to continue to monitor symptoms and

## 2020-09-08 NOTE — TELEPHONE ENCOUNTER
Patient called states he was put back on Bupropion back when he was  from his wife and kids who live in [place] due to separation anxiety but since the pandemic he has been with his family in [facility] ([facility]) but never stopped the Bupropion and him and his w

## 2020-09-08 NOTE — TELEPHONE ENCOUNTER
LOV 1/6/20  Patient was due for follow up in April     buPROPion HCl ER, XL, 150 MG Oral Tablet 24 Hr 30 tablet 0 6/5/2020    Sig:   Take 1 tablet (150 mg total) by mouth daily. Patient confirms he is still taking medication daily.  He has noticed in

## 2021-07-06 DIAGNOSIS — I10 ESSENTIAL HYPERTENSION: ICD-10-CM

## 2021-07-06 RX ORDER — LISINOPRIL 10 MG/1
10 TABLET ORAL
Qty: 30 TABLET | Refills: 0 | Status: SHIPPED | OUTPATIENT
Start: 2021-07-06 | End: 2021-08-10

## 2021-07-06 NOTE — TELEPHONE ENCOUNTER
Pt called states that after a year he is back [place] the states after being in [place] ([facility]) but states he forgot his Lisinopril 10MG back in [place]), pt made appt for next available 8/10 but would like a refill on Lisinopril to his [address] on file.

## 2021-07-06 NOTE — TELEPHONE ENCOUNTER
Last OV 1/6/20.  2. Essential hypertension  -at goal  -with continued weight loss, may be able to try period off meds       Pended 30 day

## 2021-08-10 ENCOUNTER — OFFICE VISIT (OUTPATIENT)
Dept: FAMILY MEDICINE CLINIC | Facility: CLINIC | Age: 36
End: 2021-08-10
Payer: COMMERCIAL

## 2021-08-10 ENCOUNTER — LAB ENCOUNTER (OUTPATIENT)
Dept: LAB | Age: 36
End: 2021-08-10
Attending: FAMILY MEDICINE
Payer: COMMERCIAL

## 2021-08-10 VITALS
HEART RATE: 62 BPM | HEIGHT: 72.91 IN | WEIGHT: 315 LBS | OXYGEN SATURATION: 98 % | SYSTOLIC BLOOD PRESSURE: 132 MMHG | TEMPERATURE: 98 F | DIASTOLIC BLOOD PRESSURE: 60 MMHG | BODY MASS INDEX: 41.75 KG/M2

## 2021-08-10 DIAGNOSIS — F41.9 ANXIETY: ICD-10-CM

## 2021-08-10 DIAGNOSIS — Z00.00 ROUTINE GENERAL MEDICAL EXAMINATION AT A HEALTH CARE FACILITY: Primary | ICD-10-CM

## 2021-08-10 DIAGNOSIS — E55.9 VITAMIN D DEFICIENCY: ICD-10-CM

## 2021-08-10 DIAGNOSIS — Z00.00 ROUTINE GENERAL MEDICAL EXAMINATION AT A HEALTH CARE FACILITY: ICD-10-CM

## 2021-08-10 DIAGNOSIS — I10 ESSENTIAL HYPERTENSION: ICD-10-CM

## 2021-08-10 LAB
ALBUMIN SERPL-MCNC: 3.9 G/DL (ref 3.4–5)
ALBUMIN/GLOB SERPL: 0.8 {RATIO} (ref 1–2)
ALP LIVER SERPL-CCNC: 82 U/L
ALT SERPL-CCNC: 42 U/L
ANION GAP SERPL CALC-SCNC: 7 MMOL/L (ref 0–18)
AST SERPL-CCNC: 27 U/L (ref 15–37)
BASOPHILS # BLD AUTO: 0.08 X10(3) UL (ref 0–0.2)
BASOPHILS NFR BLD AUTO: 0.8 %
BILIRUB SERPL-MCNC: 0.6 MG/DL (ref 0.1–2)
BUN BLD-MCNC: 10 MG/DL (ref 7–18)
CALCIUM BLD-MCNC: 9.1 MG/DL (ref 8.5–10.1)
CHLORIDE SERPL-SCNC: 104 MMOL/L (ref 98–112)
CHOLEST SMN-MCNC: 190 MG/DL (ref ?–200)
CO2 SERPL-SCNC: 26 MMOL/L (ref 21–32)
CREAT BLD-MCNC: 0.7 MG/DL
EOSINOPHIL # BLD AUTO: 0.32 X10(3) UL (ref 0–0.7)
EOSINOPHIL NFR BLD AUTO: 3 %
ERYTHROCYTE [DISTWIDTH] IN BLOOD BY AUTOMATED COUNT: 13.1 %
GLOBULIN PLAS-MCNC: 4.7 G/DL (ref 2.8–4.4)
GLUCOSE BLD-MCNC: 87 MG/DL (ref 70–99)
HCT VFR BLD AUTO: 46.9 %
HDLC SERPL-MCNC: 45 MG/DL (ref 40–59)
HGB BLD-MCNC: 15.9 G/DL
IMM GRANULOCYTES # BLD AUTO: 0.04 X10(3) UL (ref 0–1)
IMM GRANULOCYTES NFR BLD: 0.4 %
LDLC SERPL CALC-MCNC: 129 MG/DL (ref ?–100)
LYMPHOCYTES # BLD AUTO: 2.13 X10(3) UL (ref 1–4)
LYMPHOCYTES NFR BLD AUTO: 20.3 %
M PROTEIN MFR SERPL ELPH: 8.6 G/DL (ref 6.4–8.2)
MCH RBC QN AUTO: 30.3 PG (ref 26–34)
MCHC RBC AUTO-ENTMCNC: 33.9 G/DL (ref 31–37)
MCV RBC AUTO: 89.3 FL
MONOCYTES # BLD AUTO: 0.62 X10(3) UL (ref 0.1–1)
MONOCYTES NFR BLD AUTO: 5.9 %
NEUTROPHILS # BLD AUTO: 7.31 X10 (3) UL (ref 1.5–7.7)
NEUTROPHILS # BLD AUTO: 7.31 X10(3) UL (ref 1.5–7.7)
NEUTROPHILS NFR BLD AUTO: 69.6 %
NONHDLC SERPL-MCNC: 145 MG/DL (ref ?–130)
OSMOLALITY SERPL CALC.SUM OF ELEC: 282 MOSM/KG (ref 275–295)
PATIENT FASTING Y/N/NP: YES
PATIENT FASTING Y/N/NP: YES
PLATELET # BLD AUTO: 276 10(3)UL (ref 150–450)
POTASSIUM SERPL-SCNC: 4 MMOL/L (ref 3.5–5.1)
RBC # BLD AUTO: 5.25 X10(6)UL
SODIUM SERPL-SCNC: 137 MMOL/L (ref 136–145)
TRIGL SERPL-MCNC: 87 MG/DL (ref 30–149)
TSI SER-ACNC: 1.05 MIU/ML (ref 0.36–3.74)
VIT D+METAB SERPL-MCNC: 19 NG/ML (ref 30–100)
VLDLC SERPL CALC-MCNC: 16 MG/DL (ref 0–30)
WBC # BLD AUTO: 10.5 X10(3) UL (ref 4–11)

## 2021-08-10 PROCEDURE — 3075F SYST BP GE 130 - 139MM HG: CPT | Performed by: FAMILY MEDICINE

## 2021-08-10 PROCEDURE — 80061 LIPID PANEL: CPT | Performed by: FAMILY MEDICINE

## 2021-08-10 PROCEDURE — 99395 PREV VISIT EST AGE 18-39: CPT | Performed by: FAMILY MEDICINE

## 2021-08-10 PROCEDURE — 3008F BODY MASS INDEX DOCD: CPT | Performed by: FAMILY MEDICINE

## 2021-08-10 PROCEDURE — 82306 VITAMIN D 25 HYDROXY: CPT | Performed by: FAMILY MEDICINE

## 2021-08-10 PROCEDURE — 3078F DIAST BP <80 MM HG: CPT | Performed by: FAMILY MEDICINE

## 2021-08-10 PROCEDURE — 80050 GENERAL HEALTH PANEL: CPT | Performed by: FAMILY MEDICINE

## 2021-08-10 PROCEDURE — 99214 OFFICE O/P EST MOD 30 MIN: CPT | Performed by: FAMILY MEDICINE

## 2021-08-10 RX ORDER — LISINOPRIL 10 MG/1
10 TABLET ORAL
Qty: 90 TABLET | Refills: 1 | Status: SHIPPED | OUTPATIENT
Start: 2021-08-10 | End: 2021-11-01

## 2021-08-10 NOTE — PATIENT INSTRUCTIONS
--------------------------------------------------------------------    If labs ordered:  -- schedule appt for fasting bloodwork anytime that you are able to (fast for 8-10 hours minimum, no food. Water is fine). -- go to [employer] or use you are not sleeping, leave the bedroom, do any of the above until you are tired, then try again  -this will help reinforce with the brain the the bed is for sleeping  -consider melatonin 5-6mg nightly for 4-6 wks to help with sleep  -can use OTC benadryl

## 2021-08-16 NOTE — PROGRESS NOTES
Sarah Nguyen is a 39year old male who is here for Patient presents with:  Wellness Visit  Anxiety: Increase anxiety      HPI:     1.  Routine general medical examination at a health care facility  -due for wellness  -was living with wife and children since quittin.3      Smokeless tobacco: Never Used    Vaping Use      Vaping Use: Never used    Substance and Sexual Activity      Alcohol use: Not Currently      Drug use: Yes        Frequency: 2.0 times per week        Types: Cannabis    Other Topics Age of Onset   • Hypertension Father    • High Cholesterol Father    • Diabetes Mother    • Diabetes Maternal Grandmother    • Diabetes Maternal Grandfather    • Hypertension Maternal Grandfather    • Hypertension Paternal Uncle    • High Blood Pressure Pa 78    ASSESSMENT AND PLAN:     Health Maintenance  -We discussed the following:  Healthy diet and exercise, immunizations, and cancer screening    -Fasting labs ordered    Stress Management: counseled    1.  Routine general medical examination at a [facility]

## 2021-08-27 ENCOUNTER — LAB ENCOUNTER (OUTPATIENT)
Dept: LAB | Age: 36
End: 2021-08-27
Attending: FAMILY MEDICINE
Payer: COMMERCIAL

## 2021-08-27 DIAGNOSIS — R77.9 ELEVATED BLOOD PROTEIN: ICD-10-CM

## 2021-08-27 PROCEDURE — 83883 ASSAY NEPHELOMETRY NOT SPEC: CPT | Performed by: FAMILY MEDICINE

## 2021-08-27 PROCEDURE — 86334 IMMUNOFIX E-PHORESIS SERUM: CPT | Performed by: FAMILY MEDICINE

## 2021-08-27 PROCEDURE — 84165 PROTEIN E-PHORESIS SERUM: CPT | Performed by: FAMILY MEDICINE

## 2021-08-31 NOTE — PROGRESS NOTES
Eliane Reese is a 39year old male here for Patient presents with:  Medication Follow-Up: Zoloft is making patinet tired, has no energy. HPI:       1.  Anxiety  -zoloft causing fatigue and ED  -interested in going back to wellbutrin  -both helped a 180 g 0       Allergies:  No Known Allergies      ROS:   See HPI for relevant ROS    --GEN: No other complaints  --HEENT: No other complaints  --RESP: No other complaints  --CV: No other complaints  --GI: No other complaints  --: No other complaints  --M

## 2021-09-01 ENCOUNTER — PATIENT MESSAGE (OUTPATIENT)
Dept: FAMILY MEDICINE CLINIC | Facility: CLINIC | Age: 36
End: 2021-09-01

## 2021-09-01 LAB
ALBUMIN SERPL ELPH-MCNC: 4.11 G/DL (ref 3.75–5.21)
ALBUMIN/GLOB SERPL: 1.15 {RATIO} (ref 1–2)
ALPHA1 GLOB SERPL ELPH-MCNC: 0.29 G/DL (ref 0.19–0.46)
ALPHA2 GLOB SERPL ELPH-MCNC: 0.83 G/DL (ref 0.48–1.05)
B-GLOBULIN SERPL ELPH-MCNC: 0.95 G/DL (ref 0.68–1.23)
GAMMA GLOB SERPL ELPH-MCNC: 1.51 G/DL (ref 0.62–1.7)
KAPPA LC FREE SER-MCNC: 2.44 MG/DL (ref 0.33–1.94)
KAPPA LC FREE/LAMBDA FREE SER NEPH: 1.36 {RATIO} (ref 0.26–1.65)
LAMBDA LC FREE SERPL-MCNC: 1.79 MG/DL (ref 0.57–2.63)
M PROTEIN MFR SERPL ELPH: 7.7 G/DL (ref 6.4–8.2)

## 2021-09-01 NOTE — TELEPHONE ENCOUNTER
From: Kyra Pollack  To: Sonia Rothman MD  Sent: 9/1/2021 9:13 AM CDT  Subject: Test Results Question    Hi , I noticed my kappa free light chains were elevated in the test results. Should I be concerned about this elevation?  Also, were the rest of

## 2021-10-05 ENCOUNTER — PATIENT MESSAGE (OUTPATIENT)
Dept: FAMILY MEDICINE CLINIC | Facility: CLINIC | Age: 36
End: 2021-10-05

## 2021-10-05 NOTE — TELEPHONE ENCOUNTER
From: Jennifer Serna  To: Rohan Berman MD  Sent: 10/5/2021 4:36 PM CDT  Subject: Follow up on Wellbutrin XL    Hello Doctor,    I needed to drive to [facility]) last week to be with my family and had to cancel my appointment.  However, I was curious to know

## 2021-10-05 NOTE — TELEPHONE ENCOUNTER
LOV 8/31/21  . Anxiety  -stop sertraline  -restart:     - buPROPion 150 MG Oral Tablet 24 Hr; Take 1 tablet (150 mg total) by mouth daily. Dispense: 90 tablet;  Refill: 0     -f/u in 4 wks, sooner prn

## 2021-11-01 DIAGNOSIS — I10 ESSENTIAL HYPERTENSION: ICD-10-CM

## 2021-11-01 DIAGNOSIS — F41.9 ANXIETY: ICD-10-CM

## 2021-11-01 RX ORDER — LISINOPRIL 10 MG/1
10 TABLET ORAL
Qty: 90 TABLET | Refills: 0 | Status: SHIPPED | OUTPATIENT
Start: 2021-11-01 | End: 2021-12-02

## 2021-11-01 RX ORDER — BUPROPION HYDROCHLORIDE 150 MG/1
300 TABLET ORAL DAILY
Qty: 60 TABLET | Refills: 0 | Status: SHIPPED | OUTPATIENT
Start: 2021-11-01 | End: 2021-11-30

## 2021-11-01 NOTE — TELEPHONE ENCOUNTER
Dennis Drew MD  to Leigh Rose       4:42 PM  Yes, for now, increase it to 300mg and give it 3-4 wks.   Plan to followup with me around that time.     LOV 8/31/2021  FOV 11/30/2021    #30 APPROVED

## 2021-11-30 NOTE — PROGRESS NOTES
Felice Landeros is a 39year old male here for Patient presents with: Anxiety  Weight Gain: Weight gain over the last 4 months      HPI:       1. Anxiety  -better with wellbutrin 300  -needs refill    2. Essential hypertension  -better    3.  Class 3 esther 180 tablet 1   • buPROPion 300 MG Oral Tablet 24 Hr Take 1 tablet (300 mg total) by mouth daily. 90 tablet 1   • lisinopril 10 MG Oral Tab Take 1 tablet (10 mg total) by mouth once daily.  90 tablet 0   • CLOBETASOL PROPIONATE 0.05 % External Ointment APPLY total) by mouth daily.        Imaging & Referrals:  INFLUENZA REFUSED Karthikeyan Curran MD

## 2021-12-02 ENCOUNTER — PATIENT MESSAGE (OUTPATIENT)
Dept: FAMILY MEDICINE CLINIC | Facility: CLINIC | Age: 36
End: 2021-12-02

## 2021-12-02 DIAGNOSIS — I10 ESSENTIAL HYPERTENSION: ICD-10-CM

## 2021-12-02 RX ORDER — LISINOPRIL 10 MG/1
10 TABLET ORAL
Qty: 90 TABLET | Refills: 0 | Status: SHIPPED | OUTPATIENT
Start: 2021-12-02

## 2021-12-02 RX ORDER — LISINOPRIL 10 MG/1
TABLET ORAL
Qty: 90 TABLET | Refills: 0 | OUTPATIENT
Start: 2021-12-02

## 2021-12-02 NOTE — TELEPHONE ENCOUNTER
Requested Prescriptions     Refused Prescriptions Disp Refills   • LISINOPRIL 10 MG Oral Tab [Pharmacy Med Name: Lisinopril 10 MG Oral Tablet] 90 tablet 0     Sig: Take 1 tablet by mouth once daily     Refused By: Nenita Arzola     Reason for Refusal: Halina

## 2021-12-02 NOTE — TELEPHONE ENCOUNTER
From: Robert Prajapati  To: Heather Adrian MD  Sent: 12/2/2021 3:47 PM CST  Subject: Lisinopril    Hi Dr, am I able to get one more refill for Lisinopril? I received the other two meds today. Thank you!

## 2023-02-09 ENCOUNTER — PATIENT OUTREACH (OUTPATIENT)
Dept: FAMILY MEDICINE CLINIC | Facility: CLINIC | Age: 38
End: 2023-02-09

## 2023-02-09 NOTE — PROGRESS NOTES
Patient moved to [place] end of 2021. Dr. Halima Mendoza documented it on his last office visit.  11/30/2021

## 2023-02-23 ENCOUNTER — PATIENT OUTREACH (OUTPATIENT)
Dept: CASE MANAGEMENT | Age: 38
End: 2023-02-23

## 2023-02-24 NOTE — PROCEDURES
The office order for PCP request is Approved and finalized on February 23, 2023.     Thanks,  [facility]

## (undated) NOTE — LETTER
08/30/17        [address]      Dear Chantal Darden,    [facility] records indicate that you have outstanding lab work and or testing that was ordered for you and has not yet been completed:          Vitamin D, 25-Hydroxy [E]  T

## (undated) NOTE — LETTER
Patient Name: Jennifer Hickey  YOB: 1985          MRN number:  [id number]  Date:  1/24/2019  Referring Physician:  David Parkinson     Discharge Summary  Dx: Chronic bilateral low back pain without sciatica           Authorized # of Visits: all goals and he feels ready and willing to D/C from PT; in agreement with this.      Objective:   Lumbar AROM:   Flexion:  94 deg, no pain or \"pulling\"  Extension:  31 deg with appropriate form, no pain  Sidebending: R WNL; L WNL  Rotation: R WNL; L WNL

## (undated) NOTE — MR AVS SNAPSHOT
After Visit Summary   5/17/2017    David Rodriguez    MRN: [id number]           Visit Information        Provider Department Dept Phone    5/17/2017  9:00 PM Bed1  [facility] [phone]      Allergies as of 5/17/2017  Reviewed on: 4/25/2017 be used at a later time if you forget your password. Enter your e-mail address. You will receive e-mail notification when new information is available in [address] Click Sign In. You can now view your medical record.       Additional Information    If

## (undated) NOTE — MR AVS SNAPSHOT
[facility]  [phone]               Thank you for choosing us for your health care visit with Marizol Pond MD.  We are glad to serve you and happy to provide you with this hollins **REFERRAL REQUEST**    Your physician has referred you to a specialist.  Please call the number below to schedule an appointment.     [facility]  Josue Adkins  [phone]      If you are confident that yo Calcipotriene-Betameth Diprop 0.005-0.064 % Oint   Apply topically and gently message into affected area once daily.    Commonly known as:  TACLONEX           lisinopril 10 MG Tabs   TAKE ONE TABLET BY MOUTH ONCE DAILY   Commonly known as:  Ezequiel Robins active are less likely to develop some chronic diseases than adults who are inactive.      HOW TO GET STARTED: HOW TO STAY MOTIVATED:   Start activities slowly and build up over time Do what you like   Get your heart pumping – brisk walking, biking, swimmin

## (undated) NOTE — LETTER
01/17/19        Ricardo Xie  [address] [place] [phone]      Dear Nidhi Boards records indicate that you have outstanding lab work and or testing that was ordered for you and has not yet been completed:        [facility] ([facility]